# Patient Record
Sex: FEMALE | Race: BLACK OR AFRICAN AMERICAN | Employment: FULL TIME | URBAN - METROPOLITAN AREA
[De-identification: names, ages, dates, MRNs, and addresses within clinical notes are randomized per-mention and may not be internally consistent; named-entity substitution may affect disease eponyms.]

---

## 2023-10-19 ENCOUNTER — HOSPITAL ENCOUNTER (EMERGENCY)
Age: 53
Discharge: HOME | End: 2023-10-19
Attending: EMERGENCY MEDICINE
Payer: COMMERCIAL

## 2023-10-19 VITALS
SYSTOLIC BLOOD PRESSURE: 149 MMHG | OXYGEN SATURATION: 99 % | RESPIRATION RATE: 21 BRPM | DIASTOLIC BLOOD PRESSURE: 104 MMHG | TEMPERATURE: 98.5 F | HEART RATE: 104 BPM

## 2023-10-19 DIAGNOSIS — K02.9 PAIN DUE TO DENTAL CARIES: Primary | ICD-10-CM

## 2023-10-19 DIAGNOSIS — F41.0 ANXIETY ATTACK: ICD-10-CM

## 2023-10-19 DIAGNOSIS — I10 UNCONTROLLED HYPERTENSION: ICD-10-CM

## 2023-10-19 LAB
ALBUMIN SERPL-MCNC: 4.2 G/DL (ref 3.5–5)
ALBUMIN/GLOB SERPL: 1.2 (ref 0.4–1.6)
ALP SERPL-CCNC: 121 U/L (ref 45–117)
ALT SERPL-CCNC: 6 U/L (ref 13–61)
ANION GAP SERPL CALC-SCNC: 10 MMOL/L (ref 2–11)
AST SERPL-CCNC: 12 U/L (ref 15–37)
BASOPHILS # BLD: 0.1 K/UL (ref 0–0.2)
BASOPHILS NFR BLD: 2 % (ref 0–2)
BILIRUB SERPL-MCNC: 0.3 MG/DL (ref 0.2–1.1)
BUN SERPL-MCNC: 20 MG/DL (ref 6–23)
CALCIUM SERPL-MCNC: 9.7 MG/DL (ref 8.3–10.4)
CHLORIDE SERPL-SCNC: 106 MMOL/L (ref 98–107)
CO2 SERPL-SCNC: 23 MMOL/L (ref 21–32)
CREAT SERPL-MCNC: 1.5 MG/DL (ref 0.6–1)
DIFFERENTIAL METHOD BLD: ABNORMAL
EOSINOPHIL # BLD: 0.3 K/UL (ref 0–0.8)
EOSINOPHIL NFR BLD: 5 % (ref 0.5–7.8)
ERYTHROCYTE [DISTWIDTH] IN BLOOD BY AUTOMATED COUNT: 12.5 % (ref 11.9–14.6)
GLOBULIN SER CALC-MCNC: 3.4 G/DL (ref 2.8–4.5)
GLUCOSE SERPL-MCNC: 121 MG/DL (ref 65–100)
HCT VFR BLD AUTO: 38.8 % (ref 35.8–46.3)
HGB BLD-MCNC: 12.4 G/DL (ref 11.7–15.4)
IMM GRANULOCYTES # BLD AUTO: 0 K/UL (ref 0–0.5)
IMM GRANULOCYTES NFR BLD AUTO: 0 % (ref 0–5)
LIPASE SERPL-CCNC: 41 U/L (ref 13–60)
LYMPHOCYTES # BLD: 2.2 K/UL (ref 0.5–4.6)
LYMPHOCYTES NFR BLD: 37 % (ref 13–44)
MAGNESIUM SERPL-MCNC: 2 MG/DL (ref 1.2–2.6)
MCH RBC QN AUTO: 28.7 PG (ref 26.1–32.9)
MCHC RBC AUTO-ENTMCNC: 32 G/DL (ref 31.4–35)
MCV RBC AUTO: 89.8 FL (ref 82–102)
MONOCYTES # BLD: 0.4 K/UL (ref 0.1–1.3)
MONOCYTES NFR BLD: 7 % (ref 4–12)
NEUTS SEG # BLD: 2.9 K/UL (ref 1.7–8.2)
NEUTS SEG NFR BLD: 49 % (ref 43–78)
NRBC # BLD: 0 K/UL (ref 0–0.2)
PLATELET # BLD AUTO: 319 K/UL (ref 150–450)
PMV BLD AUTO: 9.1 FL (ref 9.4–12.3)
POTASSIUM SERPL-SCNC: 4 MMOL/L (ref 3.5–5.1)
PROT SERPL-MCNC: 7.6 G/DL (ref 6.4–8.2)
RBC # BLD AUTO: 4.32 M/UL (ref 4.05–5.2)
SODIUM SERPL-SCNC: 139 MMOL/L (ref 133–143)
TROPONIN T SERPL HS-MCNC: 21.9 NG/L (ref 0–14)
WBC # BLD AUTO: 6 K/UL (ref 4.3–11.1)

## 2023-10-19 PROCEDURE — 80053 COMPREHEN METABOLIC PANEL: CPT

## 2023-10-19 PROCEDURE — 85025 COMPLETE CBC W/AUTO DIFF WBC: CPT

## 2023-10-19 PROCEDURE — 99284 EMERGENCY DEPT VISIT MOD MDM: CPT

## 2023-10-19 PROCEDURE — 6370000000 HC RX 637 (ALT 250 FOR IP): Performed by: EMERGENCY MEDICINE

## 2023-10-19 PROCEDURE — 83690 ASSAY OF LIPASE: CPT

## 2023-10-19 PROCEDURE — 84484 ASSAY OF TROPONIN QUANT: CPT

## 2023-10-19 PROCEDURE — 93005 ELECTROCARDIOGRAM TRACING: CPT | Performed by: EMERGENCY MEDICINE

## 2023-10-19 PROCEDURE — 83735 ASSAY OF MAGNESIUM: CPT

## 2023-10-19 RX ORDER — DULAGLUTIDE 4.5 MG/.5ML
4.5 INJECTION, SOLUTION SUBCUTANEOUS WEEKLY
COMMUNITY
Start: 2023-04-12

## 2023-10-19 RX ORDER — INSULIN GLARGINE 100 [IU]/ML
17 INJECTION, SOLUTION SUBCUTANEOUS
COMMUNITY
Start: 2023-08-03 | End: 2023-11-29

## 2023-10-19 RX ORDER — LORAZEPAM 1 MG/1
1 TABLET ORAL ONCE
Status: COMPLETED | OUTPATIENT
Start: 2023-10-19 | End: 2023-10-19

## 2023-10-19 RX ORDER — SPIRONOLACTONE 25 MG/1
25 TABLET ORAL DAILY
COMMUNITY
Start: 2022-04-20

## 2023-10-19 RX ORDER — TRAZODONE HYDROCHLORIDE 50 MG/1
50 TABLET ORAL
COMMUNITY

## 2023-10-19 RX ORDER — HYDROXYZINE HYDROCHLORIDE 25 MG/1
25 TABLET, FILM COATED ORAL EVERY 6 HOURS PRN
COMMUNITY
Start: 2023-04-12

## 2023-10-19 RX ORDER — GABAPENTIN 400 MG/1
400 CAPSULE ORAL
COMMUNITY
Start: 2016-10-28

## 2023-10-19 RX ORDER — BUSPIRONE HYDROCHLORIDE 30 MG/1
30 TABLET ORAL 3 TIMES DAILY
Qty: 90 TABLET | Refills: 2 | COMMUNITY
Start: 2023-08-03 | End: 2023-11-01

## 2023-10-19 RX ORDER — BACLOFEN 10 MG/1
5-10 TABLET ORAL 2 TIMES DAILY PRN
COMMUNITY
Start: 2023-08-29 | End: 2024-02-25

## 2023-10-19 RX ORDER — FAMOTIDINE 20 MG/1
20 TABLET, FILM COATED ORAL 2 TIMES DAILY
COMMUNITY
Start: 2023-05-22

## 2023-10-19 RX ORDER — PENICILLIN V POTASSIUM 500 MG/1
500 TABLET ORAL 4 TIMES DAILY
Qty: 40 TABLET | Refills: 0 | Status: SHIPPED | OUTPATIENT
Start: 2023-10-19 | End: 2023-10-29

## 2023-10-19 RX ORDER — HYDROCHLOROTHIAZIDE 12.5 MG/1
12.5 CAPSULE, GELATIN COATED ORAL
COMMUNITY
Start: 2017-02-17

## 2023-10-19 RX ORDER — INSULIN ASPART 100 [IU]/ML
15 INJECTION, SOLUTION INTRAVENOUS; SUBCUTANEOUS
COMMUNITY
Start: 2016-10-28

## 2023-10-19 RX ORDER — BUPROPION HYDROCHLORIDE 150 MG/1
150 TABLET ORAL DAILY
COMMUNITY
Start: 2023-03-01

## 2023-10-19 RX ORDER — PREGABALIN 100 MG/1
100 CAPSULE ORAL 3 TIMES DAILY
COMMUNITY
Start: 2023-08-29

## 2023-10-19 RX ORDER — AMLODIPINE BESYLATE 10 MG/1
10 TABLET ORAL
COMMUNITY
Start: 2017-02-17

## 2023-10-19 RX ORDER — HYDROCODONE BITARTRATE AND ACETAMINOPHEN 5; 325 MG/1; MG/1
1 TABLET ORAL
Status: COMPLETED | OUTPATIENT
Start: 2023-10-19 | End: 2023-10-19

## 2023-10-19 RX ORDER — ASPIRIN 81 MG/1
81 TABLET ORAL DAILY
COMMUNITY
Start: 2022-04-07

## 2023-10-19 RX ORDER — ATORVASTATIN CALCIUM 80 MG/1
80 TABLET, FILM COATED ORAL DAILY
COMMUNITY
Start: 2022-04-20

## 2023-10-19 RX ORDER — METOPROLOL SUCCINATE 50 MG/1
1 TABLET, EXTENDED RELEASE ORAL DAILY
COMMUNITY
Start: 2022-10-07

## 2023-10-19 RX ORDER — HYDROCODONE BITARTRATE AND ACETAMINOPHEN 5; 325 MG/1; MG/1
1 TABLET ORAL EVERY 6 HOURS PRN
Qty: 11 TABLET | Refills: 0 | Status: SHIPPED | OUTPATIENT
Start: 2023-10-19 | End: 2023-10-22

## 2023-10-19 RX ADMIN — HYDROCODONE BITARTRATE AND ACETAMINOPHEN 1 TABLET: 5; 325 TABLET ORAL at 19:10

## 2023-10-19 RX ADMIN — LORAZEPAM 1 MG: 1 TABLET ORAL at 19:10

## 2023-10-19 ASSESSMENT — PAIN SCALES - GENERAL
PAINLEVEL_OUTOF10: 8
PAINLEVEL_OUTOF10: 6

## 2023-10-19 ASSESSMENT — ENCOUNTER SYMPTOMS
VOICE CHANGE: 0
EYE REDNESS: 0
TROUBLE SWALLOWING: 0
COLOR CHANGE: 0
NAUSEA: 1
SHORTNESS OF BREATH: 0
BACK PAIN: 0
ABDOMINAL PAIN: 0
CHEST TIGHTNESS: 1
STRIDOR: 0
PHOTOPHOBIA: 0
VOMITING: 0

## 2023-10-19 ASSESSMENT — LIFESTYLE VARIABLES
HOW OFTEN DO YOU HAVE A DRINK CONTAINING ALCOHOL: NEVER
HOW MANY STANDARD DRINKS CONTAINING ALCOHOL DO YOU HAVE ON A TYPICAL DAY: PATIENT DOES NOT DRINK

## 2023-10-19 ASSESSMENT — PAIN - FUNCTIONAL ASSESSMENT: PAIN_FUNCTIONAL_ASSESSMENT: 0-10

## 2023-10-19 ASSESSMENT — PAIN DESCRIPTION - LOCATION
LOCATION: TEETH
LOCATION: TEETH

## 2023-10-19 NOTE — ED TRIAGE NOTES
Pt reports breaking a tooth today and shortly after she started having a racing heart. Pt with open heart surgery in the past.  Pt  and EKG completed in triage.

## 2023-10-19 NOTE — DISCHARGE INSTRUCTIONS
Take medications as prescribed  Monitor your blood pressure at home  Follow-up with a dentist or surgeon  Follow-up with your primary care physician  Return to the ER for any new, worsening or life-threatening symptoms

## 2023-10-19 NOTE — ED PROVIDER NOTES
needed    BUPROPION (WELLBUTRIN XL) 150 MG EXTENDED RELEASE TABLET    Take 1 tablet by mouth daily    BUSPIRONE (BUSPAR) 30 MG TABLET    Take 30 mg by mouth 3 times daily    DULAGLUTIDE (TRULICITY) 4.5 SS/0.2JJ SOPN    Inject 4.5 mg into the skin once a week    EMPAGLIFLOZIN (JARDIANCE) 10 MG TABLET    Take 1 tablet by mouth daily    FAMOTIDINE (PEPCID) 20 MG TABLET    Take 1 tablet by mouth 2 times daily    GABAPENTIN (NEURONTIN) 400 MG CAPSULE    Take 1 capsule by mouth. HYDROCHLOROTHIAZIDE (MICROZIDE) 12.5 MG CAPSULE    Take 1 capsule by mouth    HYDROXYZINE HCL (ATARAX) 25 MG TABLET    Take 1 tablet by mouth every 6 hours as needed    INSULIN ASPART (NOVOLOG FLEXPEN) 100 UNIT/ML INJECTION PEN    Inject 15 Units into the skin    INSULIN DETEMIR (LEVEMIR FLEXTOUCH) 100 UNIT/ML INJECTION PEN    Inject into the skin daily    INSULIN GLARGINE (LANTUS) 100 UNIT/ML INJECTION VIAL    Inject 17 Units into the skin daily (with breakfast)    METOPROLOL SUCCINATE (TOPROL XL) 50 MG EXTENDED RELEASE TABLET    Take 1 tablet by mouth daily    ONDANSETRON (ZOFRAN-ODT) 4 MG DISINTEGRATING TABLET    Take 4 mg by mouth every 8 hours as needed    PREGABALIN (LYRICA) 100 MG CAPSULE    Take 1 capsule by mouth 3 times daily.  Max Daily Amount: 300 mg    PROMETHAZINE (PHENERGAN) 25 MG TABLET    Take 25 mg by mouth every 6 hours as needed    SPIRONOLACTONE (ALDACTONE) 25 MG TABLET    Take 1 tablet by mouth daily    TRAZODONE (DESYREL) 50 MG TABLET    Take 1 tablet by mouth        Results for orders placed or performed during the hospital encounter of 10/19/23   CBC with Auto Differential   Result Value Ref Range    WBC 6.0 4.3 - 11.1 K/uL    RBC 4.32 4.05 - 5.20 M/uL    Hemoglobin 12.4 11.7 - 15.4 g/dL    Hematocrit 38.8 35.8 - 46.3 %    MCV 89.8 82.0 - 102.0 FL    MCH 28.7 26.1 - 32.9 PG    MCHC 32.0 31.4 - 35.0 g/dL    RDW 12.5 11.9 - 14.6 %    Platelets 309 574 - 406 K/uL    MPV 9.1 (L) 9.4 - 12.3 FL    nRBC 0.00 0.0 - 0.2 K/uL

## 2023-10-19 NOTE — ED NOTES
Pt medicated per MAR. Pt resting fowlers on stretcher with regular, non-labored breathing. Pt in NAD and denies any further needs at this time. Side rails x2, call bell within reach. Will continue to monitor.       Niru Gomez RN  10/19/23 [id number]

## 2023-10-20 LAB
EKG ATRIAL RATE: 109 BPM
EKG DIAGNOSIS: NORMAL
EKG P AXIS: 5 DEGREES
EKG P-R INTERVAL: 156 MS
EKG Q-T INTERVAL: 326 MS
EKG QRS DURATION: 90 MS
EKG QTC CALCULATION (BAZETT): 437 MS
EKG R AXIS: 63 DEGREES
EKG T AXIS: 18 DEGREES
EKG VENTRICULAR RATE: 108 BPM

## 2023-10-20 PROCEDURE — 93010 ELECTROCARDIOGRAM REPORT: CPT | Performed by: INTERNAL MEDICINE

## 2025-02-20 ENCOUNTER — HOSPITAL ENCOUNTER (EMERGENCY)
Age: 55
Discharge: HOME | End: 2025-02-20
Attending: EMERGENCY MEDICINE
Payer: COMMERCIAL

## 2025-02-20 ENCOUNTER — APPOINTMENT (OUTPATIENT)
Dept: CT IMAGING | Age: 55
End: 2025-02-20
Payer: COMMERCIAL

## 2025-02-20 VITALS
OXYGEN SATURATION: 100 % | HEART RATE: 97 BPM | DIASTOLIC BLOOD PRESSURE: 98 MMHG | TEMPERATURE: 98.1 F | SYSTOLIC BLOOD PRESSURE: 140 MMHG | WEIGHT: 170 LBS | RESPIRATION RATE: 16 BRPM

## 2025-02-20 DIAGNOSIS — S09.90XA CLOSED HEAD INJURY, INITIAL ENCOUNTER: Primary | ICD-10-CM

## 2025-02-20 PROCEDURE — 70450 CT HEAD/BRAIN W/O DYE: CPT

## 2025-02-20 PROCEDURE — 6370000000 HC RX 637 (ALT 250 FOR IP): Performed by: EMERGENCY MEDICINE

## 2025-02-20 PROCEDURE — 99284 EMERGENCY DEPT VISIT MOD MDM: CPT

## 2025-02-20 PROCEDURE — 72125 CT NECK SPINE W/O DYE: CPT

## 2025-02-20 RX ORDER — HYDROCODONE BITARTRATE AND ACETAMINOPHEN 5; 325 MG/1; MG/1
1 TABLET ORAL
Status: COMPLETED | OUTPATIENT
Start: 2025-02-20 | End: 2025-02-20

## 2025-02-20 RX ADMIN — HYDROCODONE BITARTRATE AND ACETAMINOPHEN 1 TABLET: 5; 325 TABLET ORAL at 17:04

## 2025-02-20 ASSESSMENT — PAIN SCALES - GENERAL
PAINLEVEL_OUTOF10: 7
PAINLEVEL_OUTOF10: 5
PAINLEVEL_OUTOF10: 7

## 2025-02-20 ASSESSMENT — PAIN - FUNCTIONAL ASSESSMENT: PAIN_FUNCTIONAL_ASSESSMENT: 0-10

## 2025-02-20 NOTE — ED NOTES
Pt presents to the ED with her daughter after a fall this morning.  States that she fell backwards and hit the back of her head.  States that she has blurry vision in the left eye.  Is able to view her phone without problems

## 2025-02-20 NOTE — ED TRIAGE NOTES
Pt coming in for fall this morning. Pt states head strike to the back of her head and has had headache since. Pt states left sided neck pain as well. Pt states blurred vision to left eye only.

## 2025-02-20 NOTE — ED PROVIDER NOTES
Emergency Department Provider Note       PCP: No primary care provider on file.   Age: 54 y.o.   Sex: female     DISPOSITION Discharge - Pending Orders Complete 02/20/2025 05:00:09 PM    ICD-10-CM    1. Closed head injury, initial encounter  S09.90XA           Medical Decision Making     Patient is being evaluated after ground-level fall.  With her history of blood thinning medication CT imaging will be performed of her head and neck to rule out any sign of intracerebral hemorrhage, skull fracture, neck fracture, or other process.    Patient's imaging was all negative for any acute process.  She was given oral pain medication.  Patient can be discharged.     1 or more acute illnesses that pose a threat to life or bodily function.   Shared medical decision making was utilized in creating the patients health plan today.  I independently ordered and reviewed each unique test.           I interpreted the CT Scan CT head is negative for intracerebral hemorrhage such as subdural, epidural, or traumatic subarachnoid..              History     Patient is a 54-year-old female presenting after ground-level fall.  She states she fell backwards striking the back of her head.  She complains of a posterior headache.  She complains of some pain on the left side of her neck.  Denies any midline neck pain or radicular symptoms.  Patient is on a blood thinning medication for history of DVT.    The history is provided by the patient.     Physical Exam     Vitals signs and nursing note reviewed:  Vitals:    02/20/25 1537   BP: (!) 147/103   Pulse: 98   Resp: 18   Temp: 98.1 °F (36.7 °C)   TempSrc: Oral   SpO2: 100%   Weight: 77.1 kg (170 lb)      Physical Exam  Vitals and nursing note reviewed.   Constitutional:       General: She is not in acute distress.     Appearance: Normal appearance. She is not ill-appearing, toxic-appearing or diaphoretic.   HENT:      Head: Normocephalic and atraumatic.      Right Ear: Tympanic membrane

## 2025-02-20 NOTE — ED NOTES
Patient mobility status  with no difficulty.     I have reviewed discharge instructions with the patient.  The patient verbalized understanding.    Patient left ED via Discharge Method: ambulatory to Home with Child.    Opportunity for questions and clarification provided.     Patient given 0 scripts.

## 2025-08-12 ENCOUNTER — APPOINTMENT (OUTPATIENT)
Dept: GENERAL RADIOLOGY | Age: 55
End: 2025-08-12
Payer: COMMERCIAL

## 2025-08-12 ENCOUNTER — HOSPITAL ENCOUNTER (EMERGENCY)
Age: 55
Discharge: ANOTHER HOSPITAL | End: 2025-08-12
Attending: EMERGENCY MEDICINE
Payer: COMMERCIAL

## 2025-08-12 ENCOUNTER — HOSPITAL ENCOUNTER (INPATIENT)
Age: 55
LOS: 2 days | Discharge: HOME | DRG: 291 | End: 2025-08-14
Attending: FAMILY MEDICINE | Admitting: INTERNAL MEDICINE
Payer: COMMERCIAL

## 2025-08-12 ENCOUNTER — APPOINTMENT (OUTPATIENT)
Dept: CT IMAGING | Age: 55
End: 2025-08-12
Payer: COMMERCIAL

## 2025-08-12 VITALS
WEIGHT: 192.4 LBS | OXYGEN SATURATION: 97 % | TEMPERATURE: 98.2 F | SYSTOLIC BLOOD PRESSURE: 153 MMHG | RESPIRATION RATE: 15 BRPM | BODY MASS INDEX: 30.2 KG/M2 | HEART RATE: 107 BPM | HEIGHT: 67 IN | DIASTOLIC BLOOD PRESSURE: 89 MMHG

## 2025-08-12 DIAGNOSIS — R18.8 OTHER ASCITES: ICD-10-CM

## 2025-08-12 DIAGNOSIS — R10.84 GENERALIZED ABDOMINAL PAIN: Primary | ICD-10-CM

## 2025-08-12 DIAGNOSIS — I50.9 ACUTE ON CHRONIC CONGESTIVE HEART FAILURE, UNSPECIFIED HEART FAILURE TYPE (HCC): ICD-10-CM

## 2025-08-12 DIAGNOSIS — E11.42 TYPE 2 DIABETES MELLITUS WITH DIABETIC POLYNEUROPATHY, UNSPECIFIED WHETHER LONG TERM INSULIN USE (HCC): ICD-10-CM

## 2025-08-12 DIAGNOSIS — I50.43 ACUTE ON CHRONIC COMBINED SYSTOLIC AND DIASTOLIC CONGESTIVE HEART FAILURE (HCC): Primary | ICD-10-CM

## 2025-08-12 DIAGNOSIS — R65.10 SIRS (SYSTEMIC INFLAMMATORY RESPONSE SYNDROME) (HCC): ICD-10-CM

## 2025-08-12 DIAGNOSIS — I73.9 PERIPHERAL ARTERIAL DISEASE: ICD-10-CM

## 2025-08-12 PROBLEM — I10 HTN (HYPERTENSION): Status: ACTIVE | Noted: 2025-08-12

## 2025-08-12 PROBLEM — I10 ESSENTIAL HYPERTENSION: Status: ACTIVE | Noted: 2017-03-16

## 2025-08-12 PROBLEM — F32.A DEPRESSIVE DISORDER: Status: ACTIVE | Noted: 2021-12-29

## 2025-08-12 PROBLEM — N18.30 CKD (CHRONIC KIDNEY DISEASE) STAGE 3, GFR 30-59 ML/MIN (HCC): Status: ACTIVE | Noted: 2025-08-12

## 2025-08-12 PROBLEM — E78.2 MIXED HYPERLIPIDEMIA: Status: ACTIVE | Noted: 2017-02-19

## 2025-08-12 PROBLEM — R60.1 ANASARCA: Status: ACTIVE | Noted: 2025-08-12

## 2025-08-12 PROBLEM — F41.9 ANXIETY: Status: ACTIVE | Noted: 2025-08-12

## 2025-08-12 PROBLEM — I25.10 CORONARY ARTERY DISEASE INVOLVING NATIVE CORONARY ARTERY OF NATIVE HEART WITHOUT ANGINA PECTORIS: Status: ACTIVE | Noted: 2022-08-15

## 2025-08-12 LAB
ALBUMIN SERPL-MCNC: 3.7 G/DL (ref 3.5–5)
ALBUMIN/GLOB SERPL: 1.2 (ref 1–1.9)
ALP SERPL-CCNC: 147 U/L (ref 35–104)
ALT SERPL-CCNC: 8 U/L (ref 12–65)
ANION GAP SERPL CALC-SCNC: 14 MMOL/L (ref 7–16)
APPEARANCE UR: CLEAR
AST SERPL-CCNC: 16 U/L (ref 15–37)
BACTERIA URNS QL MICRO: NEGATIVE /HPF
BASOPHILS # BLD: 0.06 K/UL (ref 0–0.2)
BASOPHILS NFR BLD: 1.9 % (ref 0–2)
BILIRUB SERPL-MCNC: 1.2 MG/DL (ref 0–1.2)
BILIRUB UR QL: NEGATIVE
BUN SERPL-MCNC: 16 MG/DL (ref 6–23)
CALCIUM SERPL-MCNC: 9.2 MG/DL (ref 8.8–10.2)
CASTS URNS QL MICRO: 0 /LPF
CHLORIDE SERPL-SCNC: 107 MMOL/L (ref 98–107)
CO2 SERPL-SCNC: 20 MMOL/L (ref 20–29)
COLOR UR: ABNORMAL
CREAT SERPL-MCNC: 1.25 MG/DL (ref 0.8–1.3)
CRYSTALS URNS QL MICRO: 0 /LPF
DIFFERENTIAL METHOD BLD: ABNORMAL
EOSINOPHIL # BLD: 0.06 K/UL (ref 0–0.8)
EOSINOPHIL NFR BLD: 1.9 % (ref 0.5–7.8)
EPI CELLS #/AREA URNS HPF: ABNORMAL /HPF
ERYTHROCYTE [DISTWIDTH] IN BLOOD BY AUTOMATED COUNT: 16.3 % (ref 11.9–14.6)
EST. AVERAGE GLUCOSE BLD GHB EST-MCNC: 234 MG/DL
GLOBULIN SER CALC-MCNC: 3.1 G/DL (ref 2.3–3.5)
GLUCOSE BLD STRIP.AUTO-MCNC: 283 MG/DL (ref 65–100)
GLUCOSE SERPL-MCNC: 234 MG/DL (ref 70–99)
GLUCOSE UR STRIP.AUTO-MCNC: 100 MG/DL
HAV IGM SER QL: NONREACTIVE
HBA1C MFR BLD: 9.8 % (ref 0–5.6)
HBV CORE IGM SER QL: NONREACTIVE
HBV SURFACE AG SER QL: NONREACTIVE
HCT VFR BLD AUTO: 39.2 % (ref 35.8–46.3)
HCV AB SER QL: NONREACTIVE
HGB BLD-MCNC: 12.2 G/DL (ref 11.7–15.4)
HGB UR QL STRIP: NEGATIVE
HYALINE CASTS URNS QL MICRO: ABNORMAL /LPF
IMM GRANULOCYTES # BLD AUTO: 0 K/UL (ref 0–0.5)
IMM GRANULOCYTES NFR BLD AUTO: 0 % (ref 0–5)
KETONES UR QL STRIP.AUTO: ABNORMAL MG/DL
LACTATE SERPL-SCNC: 1.5 MMOL/L (ref 0.5–2)
LEUKOCYTE ESTERASE UR QL STRIP.AUTO: NEGATIVE
LIPASE SERPL-CCNC: 32 U/L (ref 13–60)
LYMPHOCYTES # BLD: 0.96 K/UL (ref 0.5–4.6)
LYMPHOCYTES NFR BLD: 30.9 % (ref 13–44)
MCH RBC QN AUTO: 28.1 PG (ref 26.1–32.9)
MCHC RBC AUTO-ENTMCNC: 31.1 G/DL (ref 31.4–35)
MCV RBC AUTO: 90.3 FL (ref 82–102)
MONOCYTES # BLD: 0.22 K/UL (ref 0.1–1.3)
MONOCYTES NFR BLD: 7.1 % (ref 4–12)
MUCOUS THREADS URNS QL MICRO: 0 /LPF
NEUTS SEG # BLD: 1.81 K/UL (ref 1.7–8.2)
NEUTS SEG NFR BLD: 58.2 % (ref 43–78)
NITRITE UR QL STRIP.AUTO: NEGATIVE
NRBC # BLD: 0 K/UL (ref 0–0.2)
NT PRO BNP: 6360 PG/ML (ref 0–450)
PH UR STRIP: 6 (ref 5–9)
PLATELET # BLD AUTO: 230 K/UL (ref 150–450)
PMV BLD AUTO: 10.5 FL (ref 9.4–12.3)
POTASSIUM SERPL-SCNC: 3.9 MMOL/L (ref 3.5–5.1)
PROT SERPL-MCNC: 6.8 G/DL (ref 6.3–8.2)
PROT UR STRIP-MCNC: 100 MG/DL
RBC # BLD AUTO: 4.34 M/UL (ref 4.05–5.2)
RBC #/AREA URNS HPF: ABNORMAL /HPF
SERVICE CMNT-IMP: ABNORMAL
SODIUM SERPL-SCNC: 141 MMOL/L (ref 133–143)
SP GR UR REFRACTOMETRY: 1.02 (ref 1–1.02)
TROPONIN T SERPL HS-MCNC: 38.5 NG/L (ref 0–14)
TROPONIN T SERPL HS-MCNC: 39.9 NG/L (ref 0–14)
URINE CULTURE IF INDICATED: ABNORMAL
UROBILINOGEN UR QL STRIP.AUTO: 0.2 EU/DL (ref 0.2–1)
WBC # BLD AUTO: 3.1 K/UL (ref 4.3–11.1)
WBC URNS QL MICRO: ABNORMAL /HPF

## 2025-08-12 PROCEDURE — 83690 ASSAY OF LIPASE: CPT

## 2025-08-12 PROCEDURE — 85025 COMPLETE CBC W/AUTO DIFF WBC: CPT

## 2025-08-12 PROCEDURE — 80053 COMPREHEN METABOLIC PANEL: CPT

## 2025-08-12 PROCEDURE — 2500000003 HC RX 250 WO HCPCS: Performed by: EMERGENCY MEDICINE

## 2025-08-12 PROCEDURE — 83036 HEMOGLOBIN GLYCOSYLATED A1C: CPT

## 2025-08-12 PROCEDURE — 1100000000 HC RM PRIVATE

## 2025-08-12 PROCEDURE — 94760 N-INVAS EAR/PLS OXIMETRY 1: CPT

## 2025-08-12 PROCEDURE — 74176 CT ABD & PELVIS W/O CONTRAST: CPT

## 2025-08-12 PROCEDURE — 82962 GLUCOSE BLOOD TEST: CPT

## 2025-08-12 PROCEDURE — 87040 BLOOD CULTURE FOR BACTERIA: CPT

## 2025-08-12 PROCEDURE — 36415 COLL VENOUS BLD VENIPUNCTURE: CPT

## 2025-08-12 PROCEDURE — 6370000000 HC RX 637 (ALT 250 FOR IP): Performed by: INTERNAL MEDICINE

## 2025-08-12 PROCEDURE — 6360000002 HC RX W HCPCS: Performed by: INTERNAL MEDICINE

## 2025-08-12 PROCEDURE — 80074 ACUTE HEPATITIS PANEL: CPT

## 2025-08-12 PROCEDURE — 74022 RADEX COMPL AQT ABD SERIES: CPT

## 2025-08-12 PROCEDURE — 99285 EMERGENCY DEPT VISIT HI MDM: CPT

## 2025-08-12 PROCEDURE — 2500000003 HC RX 250 WO HCPCS: Performed by: INTERNAL MEDICINE

## 2025-08-12 PROCEDURE — G0379 DIRECT REFER HOSPITAL OBSERV: HCPCS

## 2025-08-12 PROCEDURE — 84484 ASSAY OF TROPONIN QUANT: CPT

## 2025-08-12 PROCEDURE — 96365 THER/PROPH/DIAG IV INF INIT: CPT

## 2025-08-12 PROCEDURE — G0378 HOSPITAL OBSERVATION PER HR: HCPCS

## 2025-08-12 PROCEDURE — 83605 ASSAY OF LACTIC ACID: CPT

## 2025-08-12 PROCEDURE — 83880 ASSAY OF NATRIURETIC PEPTIDE: CPT

## 2025-08-12 PROCEDURE — 96375 TX/PRO/DX INJ NEW DRUG ADDON: CPT

## 2025-08-12 PROCEDURE — 81001 URINALYSIS AUTO W/SCOPE: CPT

## 2025-08-12 PROCEDURE — 6360000002 HC RX W HCPCS: Performed by: EMERGENCY MEDICINE

## 2025-08-12 RX ORDER — DEXTROSE MONOHYDRATE 100 MG/ML
INJECTION, SOLUTION INTRAVENOUS CONTINUOUS PRN
Status: DISCONTINUED | OUTPATIENT
Start: 2025-08-12 | End: 2025-08-14 | Stop reason: HOSPADM

## 2025-08-12 RX ORDER — METOPROLOL SUCCINATE 50 MG/1
50 TABLET, EXTENDED RELEASE ORAL DAILY
Status: DISCONTINUED | OUTPATIENT
Start: 2025-08-12 | End: 2025-08-14 | Stop reason: HOSPADM

## 2025-08-12 RX ORDER — SODIUM CHLORIDE 0.9 % (FLUSH) 0.9 %
5-40 SYRINGE (ML) INJECTION PRN
Status: DISCONTINUED | OUTPATIENT
Start: 2025-08-12 | End: 2025-08-14 | Stop reason: HOSPADM

## 2025-08-12 RX ORDER — ACETAMINOPHEN 325 MG/1
650 TABLET ORAL EVERY 6 HOURS PRN
Status: DISCONTINUED | OUTPATIENT
Start: 2025-08-12 | End: 2025-08-14 | Stop reason: HOSPADM

## 2025-08-12 RX ORDER — FUROSEMIDE 10 MG/ML
40 INJECTION INTRAMUSCULAR; INTRAVENOUS DAILY
Status: DISCONTINUED | OUTPATIENT
Start: 2025-08-12 | End: 2025-08-12

## 2025-08-12 RX ORDER — ACETAMINOPHEN 650 MG/1
650 SUPPOSITORY RECTAL EVERY 6 HOURS PRN
Status: DISCONTINUED | OUTPATIENT
Start: 2025-08-12 | End: 2025-08-14 | Stop reason: HOSPADM

## 2025-08-12 RX ORDER — FAMOTIDINE 20 MG/1
20 TABLET, FILM COATED ORAL 2 TIMES DAILY
Status: DISCONTINUED | OUTPATIENT
Start: 2025-08-12 | End: 2025-08-14 | Stop reason: HOSPADM

## 2025-08-12 RX ORDER — ENOXAPARIN SODIUM 100 MG/ML
40 INJECTION SUBCUTANEOUS DAILY
Status: DISCONTINUED | OUTPATIENT
Start: 2025-08-13 | End: 2025-08-14 | Stop reason: HOSPADM

## 2025-08-12 RX ORDER — INSULIN GLARGINE 100 [IU]/ML
15 INJECTION, SOLUTION SUBCUTANEOUS DAILY
Status: DISCONTINUED | OUTPATIENT
Start: 2025-08-13 | End: 2025-08-13

## 2025-08-12 RX ORDER — ASPIRIN 81 MG/1
81 TABLET ORAL DAILY
Status: DISCONTINUED | OUTPATIENT
Start: 2025-08-12 | End: 2025-08-14 | Stop reason: HOSPADM

## 2025-08-12 RX ORDER — ATORVASTATIN CALCIUM 80 MG/1
80 TABLET, FILM COATED ORAL DAILY
Status: DISCONTINUED | OUTPATIENT
Start: 2025-08-12 | End: 2025-08-13

## 2025-08-12 RX ORDER — HYDROXYZINE HYDROCHLORIDE 25 MG/1
25 TABLET, FILM COATED ORAL EVERY 6 HOURS PRN
Status: DISCONTINUED | OUTPATIENT
Start: 2025-08-12 | End: 2025-08-14 | Stop reason: HOSPADM

## 2025-08-12 RX ORDER — IBUPROFEN 600 MG/1
1 TABLET ORAL PRN
Status: DISCONTINUED | OUTPATIENT
Start: 2025-08-12 | End: 2025-08-14 | Stop reason: HOSPADM

## 2025-08-12 RX ORDER — INSULIN LISPRO 100 [IU]/ML
0-8 INJECTION, SOLUTION INTRAVENOUS; SUBCUTANEOUS
Status: DISCONTINUED | OUTPATIENT
Start: 2025-08-12 | End: 2025-08-14 | Stop reason: HOSPADM

## 2025-08-12 RX ORDER — METRONIDAZOLE 500 MG/100ML
500 INJECTION, SOLUTION INTRAVENOUS ONCE
Status: COMPLETED | OUTPATIENT
Start: 2025-08-12 | End: 2025-08-12

## 2025-08-12 RX ORDER — SODIUM CHLORIDE 9 MG/ML
INJECTION, SOLUTION INTRAVENOUS PRN
Status: DISCONTINUED | OUTPATIENT
Start: 2025-08-12 | End: 2025-08-14 | Stop reason: HOSPADM

## 2025-08-12 RX ORDER — SODIUM CHLORIDE 0.9 % (FLUSH) 0.9 %
5-40 SYRINGE (ML) INJECTION EVERY 12 HOURS SCHEDULED
Status: DISCONTINUED | OUTPATIENT
Start: 2025-08-12 | End: 2025-08-14 | Stop reason: HOSPADM

## 2025-08-12 RX ORDER — FUROSEMIDE 10 MG/ML
40 INJECTION INTRAMUSCULAR; INTRAVENOUS 2 TIMES DAILY
Status: DISCONTINUED | OUTPATIENT
Start: 2025-08-12 | End: 2025-08-14 | Stop reason: HOSPADM

## 2025-08-12 RX ORDER — PREGABALIN 100 MG/1
100 CAPSULE ORAL 3 TIMES DAILY
Status: DISCONTINUED | OUTPATIENT
Start: 2025-08-12 | End: 2025-08-14 | Stop reason: HOSPADM

## 2025-08-12 RX ORDER — FAMOTIDINE 20 MG/1
10 TABLET, FILM COATED ORAL DAILY PRN
Status: DISCONTINUED | OUTPATIENT
Start: 2025-08-12 | End: 2025-08-14 | Stop reason: HOSPADM

## 2025-08-12 RX ORDER — POLYETHYLENE GLYCOL 3350 17 G/17G
17 POWDER, FOR SOLUTION ORAL DAILY PRN
Status: DISCONTINUED | OUTPATIENT
Start: 2025-08-12 | End: 2025-08-13

## 2025-08-12 RX ORDER — AMLODIPINE BESYLATE 10 MG/1
10 TABLET ORAL NIGHTLY PRN
Status: DISCONTINUED | OUTPATIENT
Start: 2025-08-12 | End: 2025-08-14 | Stop reason: HOSPADM

## 2025-08-12 RX ORDER — BISACODYL 10 MG
10 SUPPOSITORY, RECTAL RECTAL DAILY PRN
Status: DISCONTINUED | OUTPATIENT
Start: 2025-08-12 | End: 2025-08-14 | Stop reason: HOSPADM

## 2025-08-12 RX ORDER — POTASSIUM CHLORIDE 7.45 MG/ML
10 INJECTION INTRAVENOUS PRN
Status: DISCONTINUED | OUTPATIENT
Start: 2025-08-12 | End: 2025-08-14 | Stop reason: HOSPADM

## 2025-08-12 RX ORDER — ONDANSETRON 2 MG/ML
4 INJECTION INTRAMUSCULAR; INTRAVENOUS EVERY 6 HOURS PRN
Status: DISCONTINUED | OUTPATIENT
Start: 2025-08-12 | End: 2025-08-14 | Stop reason: HOSPADM

## 2025-08-12 RX ORDER — POTASSIUM CHLORIDE 7.45 MG/ML
10 INJECTION INTRAVENOUS PRN
Status: DISCONTINUED | OUTPATIENT
Start: 2025-08-12 | End: 2025-08-12 | Stop reason: SDUPTHER

## 2025-08-12 RX ORDER — MAGNESIUM SULFATE IN WATER 40 MG/ML
2000 INJECTION, SOLUTION INTRAVENOUS PRN
Status: DISCONTINUED | OUTPATIENT
Start: 2025-08-12 | End: 2025-08-14 | Stop reason: HOSPADM

## 2025-08-12 RX ORDER — POTASSIUM CHLORIDE 1500 MG/1
40 TABLET, EXTENDED RELEASE ORAL PRN
Status: DISCONTINUED | OUTPATIENT
Start: 2025-08-12 | End: 2025-08-14 | Stop reason: HOSPADM

## 2025-08-12 RX ORDER — MAGNESIUM HYDROXIDE/ALUMINUM HYDROXICE/SIMETHICONE 120; 1200; 1200 MG/30ML; MG/30ML; MG/30ML
30 SUSPENSION ORAL EVERY 6 HOURS PRN
Status: DISCONTINUED | OUTPATIENT
Start: 2025-08-12 | End: 2025-08-14 | Stop reason: HOSPADM

## 2025-08-12 RX ORDER — TRAZODONE HYDROCHLORIDE 50 MG/1
50 TABLET ORAL NIGHTLY PRN
Status: DISCONTINUED | OUTPATIENT
Start: 2025-08-12 | End: 2025-08-14 | Stop reason: HOSPADM

## 2025-08-12 RX ORDER — SPIRONOLACTONE 25 MG/1
25 TABLET ORAL DAILY
Status: DISCONTINUED | OUTPATIENT
Start: 2025-08-12 | End: 2025-08-14 | Stop reason: HOSPADM

## 2025-08-12 RX ORDER — BUPROPION HYDROCHLORIDE 150 MG/1
150 TABLET ORAL DAILY
Status: DISCONTINUED | OUTPATIENT
Start: 2025-08-12 | End: 2025-08-14 | Stop reason: HOSPADM

## 2025-08-12 RX ORDER — ONDANSETRON 4 MG/1
4 TABLET, ORALLY DISINTEGRATING ORAL EVERY 8 HOURS PRN
Status: DISCONTINUED | OUTPATIENT
Start: 2025-08-12 | End: 2025-08-14 | Stop reason: HOSPADM

## 2025-08-12 RX ADMIN — ASPIRIN 81 MG: 81 TABLET ORAL at 18:17

## 2025-08-12 RX ADMIN — METRONIDAZOLE 500 MG: 500 INJECTION, SOLUTION INTRAVENOUS at 13:13

## 2025-08-12 RX ADMIN — WATER 2000 MG: 1 INJECTION INTRAMUSCULAR; INTRAVENOUS; SUBCUTANEOUS at 13:04

## 2025-08-12 RX ADMIN — INSULIN LISPRO 4 UNITS: 100 INJECTION, SOLUTION INTRAVENOUS; SUBCUTANEOUS at 21:07

## 2025-08-12 RX ADMIN — FUROSEMIDE 40 MG: 10 INJECTION, SOLUTION INTRAMUSCULAR; INTRAVENOUS at 18:18

## 2025-08-12 RX ADMIN — SODIUM CHLORIDE, PRESERVATIVE FREE 5 ML: 5 INJECTION INTRAVENOUS at 21:08

## 2025-08-12 RX ADMIN — METOPROLOL SUCCINATE 50 MG: 50 TABLET, EXTENDED RELEASE ORAL at 18:18

## 2025-08-12 RX ADMIN — PREGABALIN 100 MG: 100 CAPSULE ORAL at 21:07

## 2025-08-12 RX ADMIN — FAMOTIDINE 20 MG: 20 TABLET, FILM COATED ORAL at 21:07

## 2025-08-12 RX ADMIN — ATORVASTATIN CALCIUM 80 MG: 80 TABLET, FILM COATED ORAL at 18:17

## 2025-08-12 RX ADMIN — BUPROPION HYDROCHLORIDE 150 MG: 150 TABLET, EXTENDED RELEASE ORAL at 18:17

## 2025-08-12 RX ADMIN — EMPAGLIFLOZIN 10 MG: 10 TABLET, FILM COATED ORAL at 18:18

## 2025-08-12 RX ADMIN — SPIRONOLACTONE 25 MG: 25 TABLET ORAL at 18:18

## 2025-08-12 ASSESSMENT — PAIN SCALES - GENERAL
PAINLEVEL_OUTOF10: 5
PAINLEVEL_OUTOF10: 0
PAINLEVEL_OUTOF10: 0

## 2025-08-12 ASSESSMENT — PAIN DESCRIPTION - LOCATION: LOCATION: ABDOMEN

## 2025-08-12 ASSESSMENT — PAIN - FUNCTIONAL ASSESSMENT: PAIN_FUNCTIONAL_ASSESSMENT: 0-10

## 2025-08-13 ENCOUNTER — APPOINTMENT (OUTPATIENT)
Dept: CT IMAGING | Age: 55
DRG: 291 | End: 2025-08-13
Attending: FAMILY MEDICINE
Payer: COMMERCIAL

## 2025-08-13 ENCOUNTER — APPOINTMENT (OUTPATIENT)
Dept: NON INVASIVE DIAGNOSTICS | Age: 55
DRG: 291 | End: 2025-08-13
Attending: INTERNAL MEDICINE
Payer: COMMERCIAL

## 2025-08-13 ENCOUNTER — APPOINTMENT (OUTPATIENT)
Dept: ULTRASOUND IMAGING | Age: 55
DRG: 291 | End: 2025-08-13
Attending: FAMILY MEDICINE
Payer: COMMERCIAL

## 2025-08-13 PROBLEM — R79.89 ELEVATED SERUM CREATININE: Status: ACTIVE | Noted: 2025-08-13

## 2025-08-13 PROBLEM — Z95.1 S/P CABG (CORONARY ARTERY BYPASS GRAFT): Status: ACTIVE | Noted: 2025-08-13

## 2025-08-13 PROBLEM — E11.42 DIABETIC POLYNEUROPATHY (HCC): Status: ACTIVE | Noted: 2025-08-13

## 2025-08-13 PROBLEM — K59.00 CONSTIPATION: Status: ACTIVE | Noted: 2025-08-13

## 2025-08-13 LAB
AMMONIA PLAS-SCNC: 24 UMOL/L (ref 11–51)
ANION GAP SERPL CALC-SCNC: 12 MMOL/L (ref 7–16)
ANION GAP SERPL CALC-SCNC: 12 MMOL/L (ref 7–16)
APTT PPP: 28.5 SEC (ref 23.3–37.4)
BASOPHILS # BLD: 0.08 K/UL (ref 0–0.2)
BASOPHILS # BLD: 0.1 K/UL (ref 0–0.2)
BASOPHILS NFR BLD: 2 % (ref 0–2)
BASOPHILS NFR BLD: 2.3 % (ref 0–2)
BUN SERPL-MCNC: 17 MG/DL (ref 6–23)
BUN SERPL-MCNC: 19 MG/DL (ref 6–23)
CALCIUM SERPL-MCNC: 9.1 MG/DL (ref 8.8–10.2)
CALCIUM SERPL-MCNC: 9.2 MG/DL (ref 8.8–10.2)
CHLORIDE SERPL-SCNC: 104 MMOL/L (ref 98–107)
CHLORIDE SERPL-SCNC: 106 MMOL/L (ref 98–107)
CO2 SERPL-SCNC: 21 MMOL/L (ref 20–29)
CO2 SERPL-SCNC: 24 MMOL/L (ref 20–29)
CREAT SERPL-MCNC: 1.4 MG/DL (ref 0.6–1.1)
CREAT SERPL-MCNC: 1.75 MG/DL (ref 0.6–1.1)
D DIMER PPP FEU-MCNC: 2.93 UG/ML(FEU)
DIFFERENTIAL METHOD BLD: ABNORMAL
DIFFERENTIAL METHOD BLD: ABNORMAL
ECHO BSA: 2.03 M2
ECHO EST RA PRESSURE: 8 MMHG
ECHO IVC PROX: 1.9 CM
ECHO LV EDV A2C: 114 ML
ECHO LV EDV A4C: 101 ML
ECHO LV EDV INDEX A4C: 51 ML/M2
ECHO LV EDV NDEX A2C: 57 ML/M2
ECHO LV EF PHYSICIAN: 25 %
ECHO LV EJECTION FRACTION A2C: 27 %
ECHO LV EJECTION FRACTION A4C: 32 %
ECHO LV EJECTION FRACTION BIPLANE: 29 % (ref 55–100)
ECHO LV ESV A2C: 83 ML
ECHO LV ESV A4C: 69 ML
ECHO LV ESV INDEX A2C: 42 ML/M2
ECHO LV ESV INDEX A4C: 35 ML/M2
ECHO LV FRACTIONAL SHORTENING: 18 % (ref 28–44)
ECHO LV INTERNAL DIMENSION DIASTOLE INDEX: 2.26 CM/M2
ECHO LV INTERNAL DIMENSION DIASTOLIC: 4.5 CM (ref 3.9–5.3)
ECHO LV INTERNAL DIMENSION SYSTOLIC INDEX: 1.86 CM/M2
ECHO LV INTERNAL DIMENSION SYSTOLIC: 3.7 CM
ECHO LV IVSD: 1.1 CM (ref 0.6–0.9)
ECHO LV MASS 2D: 210.2 G (ref 67–162)
ECHO LV MASS INDEX 2D: 105.6 G/M2 (ref 43–95)
ECHO LV POSTERIOR WALL DIASTOLIC: 1.4 CM (ref 0.6–0.9)
ECHO LV RELATIVE WALL THICKNESS RATIO: 0.62
ECHO RIGHT VENTRICULAR SYSTOLIC PRESSURE (RVSP): 29 MMHG
ECHO RV FREE WALL PEAK S': 6.1 CM/S
ECHO TV REGURGITANT MAX VELOCITY: 2.31 M/S
ECHO TV REGURGITANT PEAK GRADIENT: 21 MMHG
EOSINOPHIL # BLD: 0.06 K/UL (ref 0–0.8)
EOSINOPHIL # BLD: 0.07 K/UL (ref 0–0.8)
EOSINOPHIL NFR BLD: 1.5 % (ref 0.5–7.8)
EOSINOPHIL NFR BLD: 1.6 % (ref 0.5–7.8)
ERYTHROCYTE [DISTWIDTH] IN BLOOD BY AUTOMATED COUNT: 16.5 % (ref 11.9–14.6)
ERYTHROCYTE [DISTWIDTH] IN BLOOD BY AUTOMATED COUNT: 16.7 % (ref 11.9–14.6)
GLUCOSE BLD STRIP.AUTO-MCNC: 143 MG/DL (ref 65–100)
GLUCOSE BLD STRIP.AUTO-MCNC: 155 MG/DL (ref 65–100)
GLUCOSE BLD STRIP.AUTO-MCNC: 161 MG/DL (ref 65–100)
GLUCOSE BLD STRIP.AUTO-MCNC: 228 MG/DL (ref 65–100)
GLUCOSE SERPL-MCNC: 192 MG/DL (ref 70–99)
GLUCOSE SERPL-MCNC: 240 MG/DL (ref 70–99)
HCT VFR BLD AUTO: 36 % (ref 35.8–46.3)
HCT VFR BLD AUTO: 40.1 % (ref 35.8–46.3)
HGB BLD-MCNC: 11.4 G/DL (ref 11.7–15.4)
HGB BLD-MCNC: 12.1 G/DL (ref 11.7–15.4)
IMM GRANULOCYTES # BLD AUTO: 0.01 K/UL (ref 0–0.5)
IMM GRANULOCYTES # BLD AUTO: 0.01 K/UL (ref 0–0.5)
IMM GRANULOCYTES NFR BLD AUTO: 0.2 % (ref 0–5)
IMM GRANULOCYTES NFR BLD AUTO: 0.2 % (ref 0–5)
INR PPP: 1.4
LYMPHOCYTES # BLD: 1.18 K/UL (ref 0.5–4.6)
LYMPHOCYTES # BLD: 1.26 K/UL (ref 0.5–4.6)
LYMPHOCYTES NFR BLD: 28.9 % (ref 13–44)
LYMPHOCYTES NFR BLD: 29.4 % (ref 13–44)
MCH RBC QN AUTO: 27.9 PG (ref 26.1–32.9)
MCH RBC QN AUTO: 28.5 PG (ref 26.1–32.9)
MCHC RBC AUTO-ENTMCNC: 30.2 G/DL (ref 31.4–35)
MCHC RBC AUTO-ENTMCNC: 31.7 G/DL (ref 31.4–35)
MCV RBC AUTO: 90 FL (ref 82–102)
MCV RBC AUTO: 92.4 FL (ref 82–102)
MONOCYTES # BLD: 0.4 K/UL (ref 0.1–1.3)
MONOCYTES # BLD: 0.42 K/UL (ref 0.1–1.3)
MONOCYTES NFR BLD: 9.8 % (ref 4–12)
MONOCYTES NFR BLD: 9.8 % (ref 4–12)
NEUTS SEG # BLD: 2.35 K/UL (ref 1.7–8.2)
NEUTS SEG # BLD: 2.42 K/UL (ref 1.7–8.2)
NEUTS SEG NFR BLD: 56.7 % (ref 43–78)
NEUTS SEG NFR BLD: 57.6 % (ref 43–78)
NRBC # BLD: 0 K/UL (ref 0–0.2)
NRBC # BLD: 0 K/UL (ref 0–0.2)
PLATELET # BLD AUTO: 203 K/UL (ref 150–450)
PLATELET # BLD AUTO: 217 K/UL (ref 150–450)
PMV BLD AUTO: 10.2 FL (ref 9.4–12.3)
PMV BLD AUTO: 11 FL (ref 9.4–12.3)
POTASSIUM SERPL-SCNC: 3.6 MMOL/L (ref 3.5–5.1)
POTASSIUM SERPL-SCNC: 3.7 MMOL/L (ref 3.5–5.1)
PROTHROMBIN TIME: 18.3 SEC (ref 11.3–14.9)
RBC # BLD AUTO: 4 M/UL (ref 4.05–5.2)
RBC # BLD AUTO: 4.34 M/UL (ref 4.05–5.2)
SERVICE CMNT-IMP: ABNORMAL
SODIUM SERPL-SCNC: 139 MMOL/L (ref 136–145)
SODIUM SERPL-SCNC: 140 MMOL/L (ref 136–145)
TROPONIN T SERPL HS-MCNC: 61.7 NG/L (ref 0–14)
WBC # BLD AUTO: 4.1 K/UL (ref 4.3–11.1)
WBC # BLD AUTO: 4.3 K/UL (ref 4.3–11.1)

## 2025-08-13 PROCEDURE — 85025 COMPLETE CBC W/AUTO DIFF WBC: CPT

## 2025-08-13 PROCEDURE — 76700 US EXAM ABDOM COMPLETE: CPT

## 2025-08-13 PROCEDURE — 93308 TTE F-UP OR LMTD: CPT | Performed by: INTERNAL MEDICINE

## 2025-08-13 PROCEDURE — 93325 DOPPLER ECHO COLOR FLOW MAPG: CPT

## 2025-08-13 PROCEDURE — 70450 CT HEAD/BRAIN W/O DYE: CPT

## 2025-08-13 PROCEDURE — 80048 BASIC METABOLIC PNL TOTAL CA: CPT

## 2025-08-13 PROCEDURE — 82140 ASSAY OF AMMONIA: CPT

## 2025-08-13 PROCEDURE — 6360000002 HC RX W HCPCS: Performed by: INTERNAL MEDICINE

## 2025-08-13 PROCEDURE — 93005 ELECTROCARDIOGRAM TRACING: CPT | Performed by: FAMILY MEDICINE

## 2025-08-13 PROCEDURE — 97530 THERAPEUTIC ACTIVITIES: CPT

## 2025-08-13 PROCEDURE — 82962 GLUCOSE BLOOD TEST: CPT

## 2025-08-13 PROCEDURE — 85730 THROMBOPLASTIN TIME PARTIAL: CPT

## 2025-08-13 PROCEDURE — 85379 FIBRIN DEGRADATION QUANT: CPT

## 2025-08-13 PROCEDURE — 2500000003 HC RX 250 WO HCPCS: Performed by: INTERNAL MEDICINE

## 2025-08-13 PROCEDURE — 6370000000 HC RX 637 (ALT 250 FOR IP): Performed by: INTERNAL MEDICINE

## 2025-08-13 PROCEDURE — 97161 PT EVAL LOW COMPLEX 20 MIN: CPT

## 2025-08-13 PROCEDURE — 97535 SELF CARE MNGMENT TRAINING: CPT

## 2025-08-13 PROCEDURE — 6370000000 HC RX 637 (ALT 250 FOR IP): Performed by: STUDENT IN AN ORGANIZED HEALTH CARE EDUCATION/TRAINING PROGRAM

## 2025-08-13 PROCEDURE — 85610 PROTHROMBIN TIME: CPT

## 2025-08-13 PROCEDURE — 6360000004 HC RX CONTRAST MEDICATION: Performed by: INTERNAL MEDICINE

## 2025-08-13 PROCEDURE — 36415 COLL VENOUS BLD VENIPUNCTURE: CPT

## 2025-08-13 PROCEDURE — 93321 DOPPLER ECHO F-UP/LMTD STD: CPT | Performed by: INTERNAL MEDICINE

## 2025-08-13 PROCEDURE — 93325 DOPPLER ECHO COLOR FLOW MAPG: CPT | Performed by: INTERNAL MEDICINE

## 2025-08-13 PROCEDURE — 1100000000 HC RM PRIVATE

## 2025-08-13 PROCEDURE — 97165 OT EVAL LOW COMPLEX 30 MIN: CPT

## 2025-08-13 PROCEDURE — 84484 ASSAY OF TROPONIN QUANT: CPT

## 2025-08-13 RX ORDER — INSULIN GLARGINE 100 [IU]/ML
20 INJECTION, SOLUTION SUBCUTANEOUS DAILY
Status: DISCONTINUED | OUTPATIENT
Start: 2025-08-13 | End: 2025-08-14 | Stop reason: HOSPADM

## 2025-08-13 RX ORDER — POLYETHYLENE GLYCOL 3350 17 G/17G
17 POWDER, FOR SOLUTION ORAL 2 TIMES DAILY
Status: DISCONTINUED | OUTPATIENT
Start: 2025-08-13 | End: 2025-08-14 | Stop reason: HOSPADM

## 2025-08-13 RX ORDER — BISACODYL 5 MG/1
5 TABLET, DELAYED RELEASE ORAL DAILY
Status: DISCONTINUED | OUTPATIENT
Start: 2025-08-13 | End: 2025-08-14 | Stop reason: HOSPADM

## 2025-08-13 RX ORDER — ATORVASTATIN CALCIUM 80 MG/1
80 TABLET, FILM COATED ORAL NIGHTLY
Status: DISCONTINUED | OUTPATIENT
Start: 2025-08-13 | End: 2025-08-14 | Stop reason: HOSPADM

## 2025-08-13 RX ORDER — LISINOPRIL 5 MG/1
5 TABLET ORAL DAILY
Status: DISCONTINUED | OUTPATIENT
Start: 2025-08-13 | End: 2025-08-14 | Stop reason: HOSPADM

## 2025-08-13 RX ADMIN — ATORVASTATIN CALCIUM 80 MG: 80 TABLET, FILM COATED ORAL at 21:22

## 2025-08-13 RX ADMIN — FAMOTIDINE 20 MG: 20 TABLET, FILM COATED ORAL at 21:22

## 2025-08-13 RX ADMIN — FAMOTIDINE 20 MG: 20 TABLET, FILM COATED ORAL at 10:03

## 2025-08-13 RX ADMIN — BISACODYL 5 MG: 5 TABLET, COATED ORAL at 11:54

## 2025-08-13 RX ADMIN — SULFUR HEXAFLUORIDE 3 ML: KIT at 09:28

## 2025-08-13 RX ADMIN — BUPROPION HYDROCHLORIDE 150 MG: 150 TABLET, EXTENDED RELEASE ORAL at 10:03

## 2025-08-13 RX ADMIN — EMPAGLIFLOZIN 10 MG: 10 TABLET, FILM COATED ORAL at 10:03

## 2025-08-13 RX ADMIN — FUROSEMIDE 40 MG: 10 INJECTION, SOLUTION INTRAMUSCULAR; INTRAVENOUS at 11:53

## 2025-08-13 RX ADMIN — LISINOPRIL 5 MG: 5 TABLET ORAL at 15:22

## 2025-08-13 RX ADMIN — PREGABALIN 100 MG: 100 CAPSULE ORAL at 10:03

## 2025-08-13 RX ADMIN — SPIRONOLACTONE 25 MG: 25 TABLET ORAL at 10:03

## 2025-08-13 RX ADMIN — SODIUM CHLORIDE, PRESERVATIVE FREE 10 ML: 5 INJECTION INTRAVENOUS at 10:03

## 2025-08-13 RX ADMIN — POLYETHYLENE GLYCOL 3350 17 G: 17 POWDER, FOR SOLUTION ORAL at 11:53

## 2025-08-13 RX ADMIN — INSULIN GLARGINE 20 UNITS: 100 INJECTION, SOLUTION SUBCUTANEOUS at 11:53

## 2025-08-13 RX ADMIN — FUROSEMIDE 40 MG: 10 INJECTION, SOLUTION INTRAMUSCULAR; INTRAVENOUS at 18:03

## 2025-08-13 RX ADMIN — POLYETHYLENE GLYCOL 3350 17 G: 17 POWDER, FOR SOLUTION ORAL at 21:24

## 2025-08-13 RX ADMIN — ASPIRIN 81 MG: 81 TABLET ORAL at 10:03

## 2025-08-13 RX ADMIN — METOPROLOL SUCCINATE 50 MG: 50 TABLET, EXTENDED RELEASE ORAL at 10:03

## 2025-08-13 RX ADMIN — PREGABALIN 100 MG: 100 CAPSULE ORAL at 13:41

## 2025-08-13 RX ADMIN — INSULIN LISPRO 2 UNITS: 100 INJECTION, SOLUTION INTRAVENOUS; SUBCUTANEOUS at 21:22

## 2025-08-13 RX ADMIN — ACETAMINOPHEN 650 MG: 325 TABLET ORAL at 21:37

## 2025-08-13 RX ADMIN — SODIUM CHLORIDE, PRESERVATIVE FREE 10 ML: 5 INJECTION INTRAVENOUS at 21:23

## 2025-08-13 ASSESSMENT — PAIN - FUNCTIONAL ASSESSMENT
PAIN_FUNCTIONAL_ASSESSMENT: 0-10
PAIN_FUNCTIONAL_ASSESSMENT: PREVENTS OR INTERFERES SOME ACTIVE ACTIVITIES AND ADLS

## 2025-08-13 ASSESSMENT — PAIN DESCRIPTION - ORIENTATION: ORIENTATION: ANTERIOR

## 2025-08-13 ASSESSMENT — PAIN DESCRIPTION - ONSET: ONSET: GRADUAL

## 2025-08-13 ASSESSMENT — PAIN DESCRIPTION - LOCATION: LOCATION: HEAD

## 2025-08-13 ASSESSMENT — PAIN DESCRIPTION - FREQUENCY: FREQUENCY: INTERMITTENT

## 2025-08-13 ASSESSMENT — PAIN SCALES - GENERAL
PAINLEVEL_OUTOF10: 3
PAINLEVEL_OUTOF10: 0
PAINLEVEL_OUTOF10: 0

## 2025-08-13 ASSESSMENT — PAIN DESCRIPTION - PAIN TYPE: TYPE: ACUTE PAIN

## 2025-08-13 ASSESSMENT — PAIN DESCRIPTION - DESCRIPTORS: DESCRIPTORS: ACHING

## 2025-08-14 ENCOUNTER — APPOINTMENT (OUTPATIENT)
Dept: INTERVENTIONAL RADIOLOGY/VASCULAR | Age: 55
DRG: 291 | End: 2025-08-14
Attending: INTERNAL MEDICINE
Payer: COMMERCIAL

## 2025-08-14 VITALS
TEMPERATURE: 97.3 F | OXYGEN SATURATION: 96 % | SYSTOLIC BLOOD PRESSURE: 108 MMHG | HEIGHT: 67 IN | DIASTOLIC BLOOD PRESSURE: 78 MMHG | HEART RATE: 57 BPM | RESPIRATION RATE: 16 BRPM | BODY MASS INDEX: 30.13 KG/M2 | WEIGHT: 192 LBS

## 2025-08-14 LAB
ANION GAP SERPL CALC-SCNC: 13 MMOL/L (ref 7–16)
BASOPHILS # BLD: 0.1 K/UL (ref 0–0.2)
BASOPHILS NFR BLD: 2.3 % (ref 0–2)
BUN SERPL-MCNC: 20 MG/DL (ref 6–23)
CALCIUM SERPL-MCNC: 8.8 MG/DL (ref 8.8–10.2)
CHLORIDE SERPL-SCNC: 106 MMOL/L (ref 98–107)
CO2 SERPL-SCNC: 22 MMOL/L (ref 20–29)
CREAT SERPL-MCNC: 1.89 MG/DL (ref 0.6–1.1)
DIFFERENTIAL METHOD BLD: ABNORMAL
EKG ATRIAL RATE: 69 BPM
EKG DIAGNOSIS: NORMAL
EKG P AXIS: 10 DEGREES
EKG P-R INTERVAL: 186 MS
EKG Q-T INTERVAL: 446 MS
EKG QRS DURATION: 76 MS
EKG QTC CALCULATION (BAZETT): 477 MS
EKG R AXIS: 53 DEGREES
EKG T AXIS: 135 DEGREES
EKG VENTRICULAR RATE: 69 BPM
EOSINOPHIL # BLD: 0.08 K/UL (ref 0–0.8)
EOSINOPHIL NFR BLD: 1.9 % (ref 0.5–7.8)
ERYTHROCYTE [DISTWIDTH] IN BLOOD BY AUTOMATED COUNT: 16.7 % (ref 11.9–14.6)
FOLATE SERPL-MCNC: 7 NG/ML (ref 3.1–17.5)
GLUCOSE BLD STRIP.AUTO-MCNC: 102 MG/DL (ref 65–100)
GLUCOSE SERPL-MCNC: 175 MG/DL (ref 70–99)
HCT VFR BLD AUTO: 35.9 % (ref 35.8–46.3)
HGB BLD-MCNC: 10.9 G/DL (ref 11.7–15.4)
IMM GRANULOCYTES # BLD AUTO: 0.01 K/UL (ref 0–0.5)
IMM GRANULOCYTES NFR BLD AUTO: 0.2 % (ref 0–5)
LYMPHOCYTES # BLD: 1.6 K/UL (ref 0.5–4.6)
LYMPHOCYTES NFR BLD: 37.3 % (ref 13–44)
MCH RBC QN AUTO: 28 PG (ref 26.1–32.9)
MCHC RBC AUTO-ENTMCNC: 30.4 G/DL (ref 31.4–35)
MCV RBC AUTO: 92.3 FL (ref 82–102)
MONOCYTES # BLD: 0.51 K/UL (ref 0.1–1.3)
MONOCYTES NFR BLD: 11.9 % (ref 4–12)
NEUTS SEG # BLD: 2 K/UL (ref 1.7–8.2)
NEUTS SEG NFR BLD: 46.4 % (ref 43–78)
NRBC # BLD: 0 K/UL (ref 0–0.2)
PLATELET # BLD AUTO: 208 K/UL (ref 150–450)
PLATELET COMMENT: ADEQUATE
PMV BLD AUTO: 11.1 FL (ref 9.4–12.3)
POTASSIUM SERPL-SCNC: 3.6 MMOL/L (ref 3.5–5.1)
RBC # BLD AUTO: 3.89 M/UL (ref 4.05–5.2)
RBC MORPH BLD: ABNORMAL
SERVICE CMNT-IMP: ABNORMAL
SODIUM SERPL-SCNC: 141 MMOL/L (ref 136–145)
T4 SERPL-MCNC: 6 UG/DL (ref 4.5–11.7)
TROPONIN T SERPL HS-MCNC: 61.4 NG/L (ref 0–14)
TSH, 3RD GENERATION: 0.91 UIU/ML (ref 0.27–4.2)
VIT B12 SERPL-MCNC: 763 PG/ML (ref 193–986)
WBC # BLD AUTO: 4.3 K/UL (ref 4.3–11.1)
WBC MORPH BLD: ABNORMAL

## 2025-08-14 PROCEDURE — 84443 ASSAY THYROID STIM HORMONE: CPT

## 2025-08-14 PROCEDURE — 84436 ASSAY OF TOTAL THYROXINE: CPT

## 2025-08-14 PROCEDURE — 6370000000 HC RX 637 (ALT 250 FOR IP): Performed by: INTERNAL MEDICINE

## 2025-08-14 PROCEDURE — 99232 SBSQ HOSP IP/OBS MODERATE 35: CPT | Performed by: INTERNAL MEDICINE

## 2025-08-14 PROCEDURE — 85025 COMPLETE CBC W/AUTO DIFF WBC: CPT

## 2025-08-14 PROCEDURE — 6370000000 HC RX 637 (ALT 250 FOR IP): Performed by: STUDENT IN AN ORGANIZED HEALTH CARE EDUCATION/TRAINING PROGRAM

## 2025-08-14 PROCEDURE — 82607 VITAMIN B-12: CPT

## 2025-08-14 PROCEDURE — 2580000003 HC RX 258: Performed by: FAMILY MEDICINE

## 2025-08-14 PROCEDURE — 82962 GLUCOSE BLOOD TEST: CPT

## 2025-08-14 PROCEDURE — 82746 ASSAY OF FOLIC ACID SERUM: CPT

## 2025-08-14 PROCEDURE — 80048 BASIC METABOLIC PNL TOTAL CA: CPT

## 2025-08-14 PROCEDURE — 2500000003 HC RX 250 WO HCPCS: Performed by: INTERNAL MEDICINE

## 2025-08-14 PROCEDURE — 84484 ASSAY OF TROPONIN QUANT: CPT

## 2025-08-14 PROCEDURE — 36415 COLL VENOUS BLD VENIPUNCTURE: CPT

## 2025-08-14 PROCEDURE — 6360000002 HC RX W HCPCS: Performed by: INTERNAL MEDICINE

## 2025-08-14 PROCEDURE — 93010 ELECTROCARDIOGRAM REPORT: CPT | Performed by: INTERNAL MEDICINE

## 2025-08-14 RX ORDER — INSULIN ASPART 100 [IU]/ML
2 INJECTION, SOLUTION INTRAVENOUS; SUBCUTANEOUS
Qty: 5 ADJUSTABLE DOSE PRE-FILLED PEN SYRINGE | Refills: 3 | Status: SHIPPED | OUTPATIENT
Start: 2025-08-14

## 2025-08-14 RX ORDER — TORSEMIDE 20 MG/1
20 TABLET ORAL DAILY
Qty: 30 TABLET | Refills: 1 | Status: SHIPPED | OUTPATIENT
Start: 2025-08-14

## 2025-08-14 RX ORDER — INSULIN GLARGINE 100 [IU]/ML
20 INJECTION, SOLUTION SUBCUTANEOUS
Qty: 10 ML | Refills: 1 | Status: SHIPPED | OUTPATIENT
Start: 2025-08-14 | End: 2027-08-24

## 2025-08-14 RX ORDER — 0.9 % SODIUM CHLORIDE 0.9 %
1000 INTRAVENOUS SOLUTION INTRAVENOUS ONCE
Status: COMPLETED | OUTPATIENT
Start: 2025-08-14 | End: 2025-08-14

## 2025-08-14 RX ORDER — METOPROLOL SUCCINATE 25 MG/1
25 TABLET, EXTENDED RELEASE ORAL DAILY
Qty: 90 TABLET | Refills: 1 | Status: SHIPPED | OUTPATIENT
Start: 2025-08-14

## 2025-08-14 RX ORDER — ATORVASTATIN CALCIUM 80 MG/1
80 TABLET, FILM COATED ORAL NIGHTLY
Qty: 30 TABLET | Refills: 1 | Status: SHIPPED | OUTPATIENT
Start: 2025-08-14

## 2025-08-14 RX ADMIN — PREGABALIN 100 MG: 100 CAPSULE ORAL at 08:47

## 2025-08-14 RX ADMIN — SODIUM CHLORIDE, PRESERVATIVE FREE 10 ML: 5 INJECTION INTRAVENOUS at 08:48

## 2025-08-14 RX ADMIN — BUPROPION HYDROCHLORIDE 150 MG: 150 TABLET, EXTENDED RELEASE ORAL at 08:47

## 2025-08-14 RX ADMIN — FAMOTIDINE 20 MG: 20 TABLET, FILM COATED ORAL at 08:47

## 2025-08-14 RX ADMIN — BISACODYL 5 MG: 5 TABLET, COATED ORAL at 08:47

## 2025-08-14 RX ADMIN — SPIRONOLACTONE 25 MG: 25 TABLET ORAL at 08:48

## 2025-08-14 RX ADMIN — INSULIN GLARGINE 20 UNITS: 100 INJECTION, SOLUTION SUBCUTANEOUS at 08:47

## 2025-08-14 RX ADMIN — FUROSEMIDE 40 MG: 10 INJECTION, SOLUTION INTRAMUSCULAR; INTRAVENOUS at 08:47

## 2025-08-14 RX ADMIN — SODIUM CHLORIDE 1000 ML: 0.9 INJECTION, SOLUTION INTRAVENOUS at 00:53

## 2025-08-14 RX ADMIN — EMPAGLIFLOZIN 10 MG: 10 TABLET, FILM COATED ORAL at 08:47

## 2025-08-14 RX ADMIN — LISINOPRIL 5 MG: 5 TABLET ORAL at 08:47

## 2025-08-14 RX ADMIN — ENOXAPARIN SODIUM 40 MG: 100 INJECTION SUBCUTANEOUS at 08:47

## 2025-08-14 RX ADMIN — ASPIRIN 81 MG: 81 TABLET ORAL at 08:47

## 2025-08-14 RX ADMIN — POLYETHYLENE GLYCOL 3350 17 G: 17 POWDER, FOR SOLUTION ORAL at 08:48

## 2025-08-14 ASSESSMENT — PAIN SCALES - GENERAL: PAINLEVEL_OUTOF10: 0

## 2025-08-17 LAB
BACTERIA SPEC CULT: NORMAL
BACTERIA SPEC CULT: NORMAL
SERVICE CMNT-IMP: NORMAL
SERVICE CMNT-IMP: NORMAL